# Patient Record
Sex: FEMALE | Race: WHITE | NOT HISPANIC OR LATINO | URBAN - METROPOLITAN AREA
[De-identification: names, ages, dates, MRNs, and addresses within clinical notes are randomized per-mention and may not be internally consistent; named-entity substitution may affect disease eponyms.]

---

## 2021-06-14 ENCOUNTER — EMERGENCY (EMERGENCY)
Facility: HOSPITAL | Age: 21
LOS: 1 days | Discharge: HOME | End: 2021-06-14
Attending: EMERGENCY MEDICINE | Admitting: EMERGENCY MEDICINE
Payer: COMMERCIAL

## 2021-06-14 VITALS
SYSTOLIC BLOOD PRESSURE: 106 MMHG | RESPIRATION RATE: 17 BRPM | HEIGHT: 65 IN | DIASTOLIC BLOOD PRESSURE: 69 MMHG | HEART RATE: 78 BPM | TEMPERATURE: 98 F | OXYGEN SATURATION: 96 % | WEIGHT: 119.93 LBS

## 2021-06-14 VITALS
RESPIRATION RATE: 16 BRPM | TEMPERATURE: 98 F | OXYGEN SATURATION: 97 % | SYSTOLIC BLOOD PRESSURE: 105 MMHG | DIASTOLIC BLOOD PRESSURE: 65 MMHG | HEART RATE: 75 BPM

## 2021-06-14 DIAGNOSIS — R53.81 OTHER MALAISE: ICD-10-CM

## 2021-06-14 DIAGNOSIS — Z88.0 ALLERGY STATUS TO PENICILLIN: ICD-10-CM

## 2021-06-14 LAB
ALBUMIN SERPL ELPH-MCNC: 4.1 G/DL — SIGNIFICANT CHANGE UP (ref 3.3–5)
ALP SERPL-CCNC: 45 U/L — SIGNIFICANT CHANGE UP (ref 40–120)
ALT FLD-CCNC: 8 U/L — LOW (ref 10–45)
ANION GAP SERPL CALC-SCNC: 8 MMOL/L — SIGNIFICANT CHANGE UP (ref 5–17)
AST SERPL-CCNC: 12 U/L — SIGNIFICANT CHANGE UP (ref 10–40)
BASOPHILS # BLD AUTO: 0.04 K/UL — SIGNIFICANT CHANGE UP (ref 0–0.2)
BASOPHILS NFR BLD AUTO: 0.7 % — SIGNIFICANT CHANGE UP (ref 0–2)
BILIRUB SERPL-MCNC: 0.4 MG/DL — SIGNIFICANT CHANGE UP (ref 0.2–1.2)
BUN SERPL-MCNC: 8 MG/DL — SIGNIFICANT CHANGE UP (ref 7–23)
CALCIUM SERPL-MCNC: 9.3 MG/DL — SIGNIFICANT CHANGE UP (ref 8.4–10.5)
CHLORIDE SERPL-SCNC: 105 MMOL/L — SIGNIFICANT CHANGE UP (ref 96–108)
CO2 SERPL-SCNC: 28 MMOL/L — SIGNIFICANT CHANGE UP (ref 22–31)
CREAT SERPL-MCNC: 0.74 MG/DL — SIGNIFICANT CHANGE UP (ref 0.5–1.3)
EOSINOPHIL # BLD AUTO: 0.06 K/UL — SIGNIFICANT CHANGE UP (ref 0–0.5)
EOSINOPHIL NFR BLD AUTO: 1 % — SIGNIFICANT CHANGE UP (ref 0–6)
GLUCOSE SERPL-MCNC: 112 MG/DL — HIGH (ref 70–99)
HCT VFR BLD CALC: 38.7 % — SIGNIFICANT CHANGE UP (ref 34.5–45)
HGB BLD-MCNC: 13.4 G/DL — SIGNIFICANT CHANGE UP (ref 11.5–15.5)
IMM GRANULOCYTES NFR BLD AUTO: 0.2 % — SIGNIFICANT CHANGE UP (ref 0–1.5)
LYMPHOCYTES # BLD AUTO: 2.31 K/UL — SIGNIFICANT CHANGE UP (ref 1–3.3)
LYMPHOCYTES # BLD AUTO: 40.2 % — SIGNIFICANT CHANGE UP (ref 13–44)
MCHC RBC-ENTMCNC: 30.2 PG — SIGNIFICANT CHANGE UP (ref 27–34)
MCHC RBC-ENTMCNC: 34.6 GM/DL — SIGNIFICANT CHANGE UP (ref 32–36)
MCV RBC AUTO: 87.4 FL — SIGNIFICANT CHANGE UP (ref 80–100)
MONOCYTES # BLD AUTO: 0.39 K/UL — SIGNIFICANT CHANGE UP (ref 0–0.9)
MONOCYTES NFR BLD AUTO: 6.8 % — SIGNIFICANT CHANGE UP (ref 2–14)
NEUTROPHILS # BLD AUTO: 2.94 K/UL — SIGNIFICANT CHANGE UP (ref 1.8–7.4)
NEUTROPHILS NFR BLD AUTO: 51.1 % — SIGNIFICANT CHANGE UP (ref 43–77)
NRBC # BLD: 0 /100 WBCS — SIGNIFICANT CHANGE UP (ref 0–0)
PLATELET # BLD AUTO: 161 K/UL — SIGNIFICANT CHANGE UP (ref 150–400)
POTASSIUM SERPL-MCNC: 3.6 MMOL/L — SIGNIFICANT CHANGE UP (ref 3.5–5.3)
POTASSIUM SERPL-SCNC: 3.6 MMOL/L — SIGNIFICANT CHANGE UP (ref 3.5–5.3)
PROT SERPL-MCNC: 6.8 G/DL — SIGNIFICANT CHANGE UP (ref 6–8.3)
RBC # BLD: 4.43 M/UL — SIGNIFICANT CHANGE UP (ref 3.8–5.2)
RBC # FLD: 12.4 % — SIGNIFICANT CHANGE UP (ref 10.3–14.5)
SODIUM SERPL-SCNC: 141 MMOL/L — SIGNIFICANT CHANGE UP (ref 135–145)
WBC # BLD: 5.75 K/UL — SIGNIFICANT CHANGE UP (ref 3.8–10.5)
WBC # FLD AUTO: 5.75 K/UL — SIGNIFICANT CHANGE UP (ref 3.8–10.5)

## 2021-06-14 PROCEDURE — 85025 COMPLETE CBC W/AUTO DIFF WBC: CPT

## 2021-06-14 PROCEDURE — 71046 X-RAY EXAM CHEST 2 VIEWS: CPT | Mod: 26

## 2021-06-14 PROCEDURE — 80053 COMPREHEN METABOLIC PANEL: CPT

## 2021-06-14 PROCEDURE — 36415 COLL VENOUS BLD VENIPUNCTURE: CPT

## 2021-06-14 PROCEDURE — 99284 EMERGENCY DEPT VISIT MOD MDM: CPT

## 2021-06-14 PROCEDURE — 99283 EMERGENCY DEPT VISIT LOW MDM: CPT | Mod: 25

## 2021-06-14 PROCEDURE — 71046 X-RAY EXAM CHEST 2 VIEWS: CPT

## 2021-06-14 NOTE — ED PROVIDER NOTE - PHYSICAL EXAMINATION
CONST: nontoxic NAD speaking in full sentences  HEAD: atraumatic  EYES: conjunctivae clear, PERRL, EOMI  ENT: mmm  NECK: supple/FROM, +few shotty mobile cervical lymph nodes w/o overlying skin changes,   CARD: rrr no murmurs  CHEST: ctab no r/r/w  ABD: soft, nd, nttp, no rebound/guarding  EXT: FROM, symmetric distal pulses intact  SKIN: warm, dry, no rash, no pedal edema/ttp/rash, cap refill <2sec  NEURO: a+ox3, 5/5 strength x4, gross sensation intact x4, baseline gait

## 2021-06-14 NOTE — ED ADULT NURSE NOTE - OBJECTIVE STATEMENT
20 y/o female w/o significant PMHx presents to the ED c/o generalized fatigue associated w/ chest pressure, right arm pain, vague tingling to tips of fingers. Denies fever, chills, CP, SOB, NVD, dizziness, vision changes.

## 2021-06-14 NOTE — ED PROVIDER NOTE - NSFOLLOWUPINSTRUCTIONS_ED_ALL_ED_FT
PLEASE FOLLOW-UP WITH YOUR PCP IN 1-2 DAYS.    ANY XRAY IMAGING RESULTS GIVEN IN THE EMERGENCY DEPARTMENT ARE PRELIMINARY UNTIL FORMALLY READ BY A RADIOLOGIST. YOU WILL BE CONTACTED IF THERE ARE ANY SIGNIFICANT CHANGES IN THE FINAL READ.    PLEASE RETURN TO THE EMERGENCY DEPARTMENT IF SEVERE HEADACHE, RASH, FEVER, CHEST PAIN, SHORTNESS OF BREATH, ABDOMINAL PAIN, VOMITING, OTHER CONCERNING SYMPTOMS. PLEASE FOLLOW-UP WITH YOUR PCP IN 1-2 DAYS.    ANY XRAY IMAGING RESULTS GIVEN IN THE EMERGENCY DEPARTMENT ARE PRELIMINARY UNTIL FORMALLY READ BY A RADIOLOGIST. YOU WILL BE CONTACTED IF THERE ARE ANY SIGNIFICANT CHANGES IN THE FINAL READ.    PLEASE RETURN TO THE EMERGENCY DEPARTMENT IF SEVERE HEADACHE, RASH, FEVER, CHEST PAIN, SHORTNESS OF BREATH, ABDOMINAL PAIN, VOMITING, OTHER CONCERNING SYMPTOMS.      PLEASE CONTACT JINA LAINEZ ([facility] EMERGENCY DEPARTMENT CLINICAL REFERRAL COORDINATOR) TO ASSIST IN SCHEDULING YOUR FOLLOW-UP APPOINTMENT.    Monday - Friday 11am-7pm  [phone]  [email or web address]

## 2021-06-14 NOTE — ED PROVIDER NOTE - PATIENT PORTAL LINK FT
You can access the FollowMyHealth Patient Portal offered by [facility] by registering at the following website: [email or web address]. By joining [employer]’s FollowMyHealth portal, you will also be able to view your health information using other applications (apps) compatible with our system.

## 2021-06-14 NOTE — ED PROVIDER NOTE - OBJECTIVE STATEMENT
21F nonsmoker, completed moderna covid19 vaccination (2/2021), c/o ~4mo fluctuating spotty painful neck lymph nodes, malaise and intermittent L parasternal chest wall pain worse w/ movement. pt recently moved to [facility] by herself and working at [employer]. pt concerned she may be allergies or malignancy or persistent lymph node development s/p covid19 vaccination. did undergo neck ultrasound that was reportedly wnl (end of 2/2021). +fhx breast ca (mother dx 44yo). no fever/chills, no ha/dizziness, no neck stiffness, no uri/cough, no sob, no abd pain/n/v, no diarrhea, no dysuria, no high risk sexual activity, no vaginal discharge, no rash, no prior covid, no trauma, no etoh-dpt/ivdu. 21F nonsmoker, completed moderna covid19 vaccination (2/2021), c/o ~4mo fluctuating spotty painful neck lymph nodes, malaise and intermittent L parasternal chest wall pain worse w/ movement. pt recently moved to [facility] by herself and working at [employer]. pt concerned she may be allergies or malignancy or persistent lymph node development s/p covid19 vaccination. did undergo neck ultrasound that was reportedly wnl (end of 2/2021). +fhx breast ca (mother dx 46yo). no fever/chills, no unintentional weight loss, no ha/dizziness, no neck stiffness, no uri/cough, no sob, no abd pain/n/v, no diarrhea, no dysuria, no high risk sexual activity, no vaginal discharge, no rash, no prior covid, no trauma, no etoh-dpt/ivdu.

## 2021-06-14 NOTE — ED ADULT TRIAGE NOTE - CHIEF COMPLAINT QUOTE
pt  c/o generalized fatigue, neck discomfort and " swelling" chest pressure, right arm pain and tingling to the tip of fingers for months. pmh anxiety. denies SI. ekg done.

## 2021-06-14 NOTE — ED ADULT NURSE NOTE - CAS DISCH CONDITION
Has The Growth Been Previously Biopsied?: has been previously biopsied
Body Location Override (Optional): Right upper arm
Stable

## 2021-06-14 NOTE — ED PROVIDER NOTE - CLINICAL SUMMARY MEDICAL DECISION MAKING FREE TEXT BOX
avss. nontoxic. NAD. no systemic sx. no constitutional B sx. exam w/o sig abnl. no leukocytosis vs significant anemia vs electrolyte abnl. ucg neg. cxr w/o acute focal consol vs ptx vs pulm edema vs widened mediastinum. no indication for inpatient hospitalization at this time. will dc w/ outpatient pcp fu. strict return precautions. pt agrees w/ plan. questions answered.

## 2022-03-08 NOTE — ED PROVIDER NOTE - NS ED ATTENDING STATEMENT MOD
Received a call from her Orthopedic surgeon Dr Adan Escalante today regarding anticoagulation for DVT of leg found on Ultrasound performed for calf pain. Ultrasound was performed at [address] today, I'm unable to find the report in care everywhere. Patient had R knee surgery on 02/02/2022. She may need anticoagulation therapy for DVT. I spoke to the patient over phone today and advised her to follow up with me in office tomorrow for further evaluation and to make a decision regarding anticoagulation initiation after discussing the benefits and risks. Patient expressed understanding and she will come to my office tomorrow for further evaluation. Attending Only

## 2022-06-09 ENCOUNTER — EMERGENCY (EMERGENCY)
Facility: HOSPITAL | Age: 22
LOS: 1 days | Discharge: HOME | End: 2022-06-09
Attending: STUDENT IN AN ORGANIZED HEALTH CARE EDUCATION/TRAINING PROGRAM | Admitting: STUDENT IN AN ORGANIZED HEALTH CARE EDUCATION/TRAINING PROGRAM
Payer: COMMERCIAL

## 2022-06-09 VITALS
SYSTOLIC BLOOD PRESSURE: 112 MMHG | HEART RATE: 88 BPM | RESPIRATION RATE: 18 BRPM | DIASTOLIC BLOOD PRESSURE: 72 MMHG | TEMPERATURE: 99 F | OXYGEN SATURATION: 98 % | HEIGHT: 65 IN | WEIGHT: 123.02 LBS

## 2022-06-09 DIAGNOSIS — R23.8 OTHER SKIN CHANGES: ICD-10-CM

## 2022-06-09 DIAGNOSIS — Z88.0 ALLERGY STATUS TO PENICILLIN: ICD-10-CM

## 2022-06-09 DIAGNOSIS — K58.0 IRRITABLE BOWEL SYNDROME WITH DIARRHEA: ICD-10-CM

## 2022-06-09 DIAGNOSIS — M25.521 PAIN IN RIGHT ELBOW: ICD-10-CM

## 2022-06-09 DIAGNOSIS — M79.81 NONTRAUMATIC HEMATOMA OF SOFT TISSUE: ICD-10-CM

## 2022-06-09 PROCEDURE — 99282 EMERGENCY DEPT VISIT SF MDM: CPT

## 2022-06-09 NOTE — ED ADULT TRIAGE NOTE - CHIEF COMPLAINT QUOTE
Pt s/p blood draw from right ac 1 wk ago presents reporting pain to right ac and darkening of right ac vein.

## 2022-06-09 NOTE — ED PROVIDER NOTE - PATIENT PORTAL LINK FT
You can access the FollowMyHealth Patient Portal offered by [facility] by registering at the following website: [email or web address]. By joining [employer]’s FollowMyHealth portal, you will also be able to view your health information using other applications (apps) compatible with our system.

## 2022-06-09 NOTE — ED PROVIDER NOTE - NSFOLLOWUPCLINICS_GEN_ALL_ED_FT
[facility]  Family Medicine  [address]  [place], [place] [address]  Phone: [phone]  Fax:

## 2022-06-09 NOTE — ED PROVIDER NOTE - NSFOLLOWUPINSTRUCTIONS_ED_ALL_ED_FT
Based on your medical evaluation in the Emergency Department, it is safe for you to leave the Emergency Department.  You should follow up your primary care physician.  If any new or concerning symptoms occur, you should return to the Emergency Department or speak with a doctor immediately.

## 2022-06-09 NOTE — ED ADULT NURSE NOTE - CHPI ED NUR SYMPTOMS NEG
no chills/no decreased eating/drinking/no dizziness/no fever/no nausea/no tingling/no vomiting/no weakness Advancement Flap (Double) Text: The defect edges were debeveled with a #15 scalpel blade.  Given the location of the defect and the proximity to free margins a double advancement flap was deemed most appropriate.  Using a sterile surgical marker, the appropriate advancement flaps were drawn incorporating the defect and placing the expected incisions within the relaxed skin tension lines where possible.    The area thus outlined was incised deep to adipose tissue with a #15 scalpel blade.  The skin margins were undermined to an appropriate distance in all directions utilizing iris scissors.

## 2022-06-09 NOTE — ED PROVIDER NOTE - CLINICAL SUMMARY MEDICAL DECISION MAKING FREE TEXT BOX
History and physical consistent with bruise of arm.  No sign or symptom of infection, including thrombophlebitis and cellulitis.  No sign of distal neurovascular injury.  No changes concerning for upper extremity DVT.  Plan to discharge home with return precautions and outpatient followup.

## 2022-06-09 NOTE — ED PROVIDER NOTE - PHYSICAL EXAMINATION
General: comfortable, resting in ED  HEENT: atraumatic, no eye erythema or discharge  Pulm: no cyanosis, no added work of breathing  Cardiac: extremities warm, intact peripheral pulse  GI: no abdominal distension, abdomen nontender  Neuro: alert, conversant, intact sensation radial/ulnar/median fields of hand, 5/5 hand   Psych: neutral affect, cooperative  Msk: no gross deformity or instability, 5/5 elbow flexion and extension  Skin: R arm AC with small ecchymosis around vein, no erythema or tenderness, no swelling or fluctuance

## 2022-06-09 NOTE — ED ADULT NURSE NOTE - OBJECTIVE STATEMENT
Patient c/o of pain , swelling and prominent vein on right AC area, noted today.  States had blood work drawn from same site 1 week ago but only noted pain and swelling of vein today.

## 2022-06-09 NOTE — ED PROVIDER NOTE - NS ED ROS FT
REVIEW OF SYSTEMS  positive for: myalgias,   negative for: fever, headache, eye pain, runny nose, sore throat, cough, shortness of breath, chest pain, stomach pain, vomiting, diarrhea, dysuria, rash

## 2022-06-09 NOTE — ED PROVIDER NOTE - OBJECTIVE STATEMENT
22F with IBS, hx lyme, now with 1 day of mild intermittent R medial elbow pain and discoloration, in the setting of R AC blood draw 1 week ago.  Able to move arm.

## 2024-05-21 NOTE — ED ADULT TRIAGE NOTE - PRO INTERPRETER NEED 2
Patient recently seen at [facility] and received an official diagnosis of POTS. One test showed that I have a below average peak VO2 with some pulmonary impairment to exercise. The doctor there recommended that I take part in a structured cardiac rehab program,     Can I place a referral for the patient?      Secure chat sent to Dr Vora for review   English

## 2024-08-22 ENCOUNTER — APPOINTMENT (OUTPATIENT)
Dept: ORTHOPEDIC SURGERY | Facility: CLINIC | Age: 24
End: 2024-08-22
Payer: MEDICAID

## 2024-08-22 DIAGNOSIS — M77.01 MEDIAL EPICONDYLITIS, RIGHT ELBOW: ICD-10-CM

## 2024-08-22 PROBLEM — Z00.00 ENCOUNTER FOR PREVENTIVE HEALTH EXAMINATION: Status: ACTIVE | Noted: 2024-08-22

## 2024-08-22 PROCEDURE — 99203 OFFICE O/P NEW LOW 30 MIN: CPT

## 2024-08-22 NOTE — PHYSICAL EXAM
[de-identified] : Right elbow Constitutional:  The patient is healthy-appearing and in no apparent distress.   Cardiovascular System:  There is capillary refill less than 2 seconds.   Skin:  There is no skin abnormalities of elbow.  Right Elbow:  Appearance:  There is no deformity, induration, redness, swelling, or warmth and a normal carrying angle.   Bony Palpation:  There is tenderness of the medial epicondyle. There is no tenderness of olecranon. There is no tenderness of the ulnatrochlea articulation. There is no tenderness of the coronoid process. There is no tenderness of the radial head. There is no tenderness of the radiocapitellar joint. There is no tenderness of the lateral epicondyle.   Soft Tissue Palpation:  There is no tenderness of the ulnar nerve. There is no tenderness of the biceps insertion. There is no tenderness of the pronator teres. There is tenderness of the flexor carpi ulnaris. There is no tenderness of the flexor carpi radialis. There is no tenderness of the annular ligament of the radius. There is no tenderness of the brachioradialis. There is no tenderness of the radial collateral ligament. There is no tenderness of the ulnar collateral ligament. There is no tenderness of the antecubital fossa. There is no tenderness of the extensor carpi radialis brevis. There is no tenderness of the extensor carpi radialis longus.  Range of Motion:   There is full range of motion both actively and passively.   Stability: There is no dislocation or laxity to testing.   Strength:  There is 5/5 elbow flexion, extension, supination and pronation.    Neurologic: There is normal sensation C5-T1 to light touch.   Psychiatric:  The patient demonstrates a normal mood and affect and is active and alert.

## 2024-08-22 NOTE — HISTORY OF PRESENT ILLNESS
[de-identified] : Initial visit: right arm pain Reason: no falls or injuries  Duration: 3 weeks  Prior studies: no xray order  Symptoms: soreness  Aggravating Fx: picking up stuff  Alleviating Fx: Pain level: 3/10 Pain medication: advil Medical Hx: no Surgical Hx: no Current Medication: no Allergies: amoxcillin/ penicillin

## 2024-08-22 NOTE — ASSESSMENT
[FreeTextEntry1] : Discussed at length with patient exam and history and patient at this time elects home exercises counterforce bracing and observation if no significant proving patient to follow-up in the office she is aware that this may take weeks to months to fully improve

## 2024-09-23 ENCOUNTER — APPOINTMENT (OUTPATIENT)
Dept: COLORECTAL SURGERY | Facility: CLINIC | Age: 24
End: 2024-09-23
Payer: COMMERCIAL

## 2024-09-23 DIAGNOSIS — Z78.9 OTHER SPECIFIED HEALTH STATUS: ICD-10-CM

## 2024-09-23 DIAGNOSIS — Z80.8 FAMILY HISTORY OF MALIGNANT NEOPLASM OF OTHER ORGANS OR SYSTEMS: ICD-10-CM

## 2024-09-23 DIAGNOSIS — K62.89 OTHER SPECIFIED DISEASES OF ANUS AND RECTUM: ICD-10-CM

## 2024-09-23 DIAGNOSIS — K64.9 UNSPECIFIED HEMORRHOIDS: ICD-10-CM

## 2024-09-23 DIAGNOSIS — Z80.3 FAMILY HISTORY OF MALIGNANT NEOPLASM OF BREAST: ICD-10-CM

## 2024-09-23 DIAGNOSIS — Z87.19 PERSONAL HISTORY OF OTHER DISEASES OF THE DIGESTIVE SYSTEM: ICD-10-CM

## 2024-09-23 PROCEDURE — 46600 DIAGNOSTIC ANOSCOPY SPX: CPT

## 2024-09-23 PROCEDURE — 99203 OFFICE O/P NEW LOW 30 MIN: CPT | Mod: 25

## 2024-09-23 RX ORDER — ZINC SULFATE 66 MG
TABLET ORAL
Refills: 0 | Status: ACTIVE | COMMUNITY

## 2024-09-23 RX ORDER — UBIDECARENONE/VIT E ACET 100MG-5
1000 CAPSULE ORAL
Refills: 0 | Status: ACTIVE | COMMUNITY

## 2024-09-23 NOTE — ASSESSMENT
[FreeTextEntry1] : Exam findings and diagnosis were discussed at length with patient.  Hemorrhoids appear mild on exam. Recommend optimization of bowel habits. Continue management of IBS with GI. Avoid constipation and diarrhea, avoid pushing/straining. Recommend sitz baths after BMs and as needed.. Discussed perianal hygiene techniques after BMs. Recommend topical barrier creams to anal area as needed. Recommend calmol-4 suppositories. Discussed hydrocortisone as needed episodically for symptoms. Instructions for use reviewed with patient. F/u if symptoms worsen or persist. All questions answered, patient expressed understanding and is agreeable to this plan.

## 2024-09-23 NOTE — HISTORY OF PRESENT ILLNESS
[FreeTextEntry1] : 23yo female presents for initial evaluation  Reason for visit "hemorrhoids" Referred by GI Dr Burgos  [facility] IBS PSH tonsillectomy, sinus surgery, abdominal surgery for malrotation at 3 months old  Colonoscopy 2021 with Dr Burgos as part of IBS workup.  Currently taking an antibiotic for a UTI, does not remember name. Otherwise denies medications on a regular basis.  H/o IBS, known to GI Dr Burgos for several years. H/o SIBO, has taken Xifaixin about 4 times over the past 5 years. Currently on Xifaxin and finishing course soon for recurrent SIBO  Typically has looser stools. BMs are typically twice per day.  Last month had constipation and felt pain in anal area at that time. Saw Dr Burgos and per patient he did an exam and noted "minor hemorrhoids". Was prescribed a cream to use intra-anally, patient does not remember name. Used for 3 weeks.   States pain has improved, but still has some irritation occurring in anal area.  Occasionally notes BRB on TP after a few wipes. She is concerned she might be causing irritation to anal area with wiping. Started applying neosporin to anal area.

## 2024-09-23 NOTE — PHYSICAL EXAM
[FreeTextEntry1] : Medical assistant present for duration of physical examination   General no acute distress, alert and oriented Psych calm, pleasant demeanor, responding appropriately to questions Nonlabored breathing Ambulating without assistance Skin normal color and pigment, no visible lesions or rashes   Anorectal Exam: Inspection no erythema, induration or fluctuance, no skin excoriation, no fissure, small residual external hemorrhoidal tags without acute inflammation or thrombosis KUSUM nontender, no masses palpated, no blood on gloved finger   Procedure: Anoscopy   Pre procedure Diagnosis: hemorrhoids, anal irritation Post procedure Diagnosis: hemorrhoids, anal irritation Anesthesia: none Estimated blood loss: none Specimen: none Complications: none   Consent obtained. Anoscopy was performed by passing a lighted anoscope with lubricant jelly into the anal canal and the entire anal mucosal surface was inspected. Findings included no fissure, small internal hemorrhoids with mild inflammation, no visible masses or lesions in anal canal   Patient tolerated examination and procedure well.